# Patient Record
Sex: MALE | Race: BLACK OR AFRICAN AMERICAN | NOT HISPANIC OR LATINO | Employment: FULL TIME | URBAN - METROPOLITAN AREA
[De-identification: names, ages, dates, MRNs, and addresses within clinical notes are randomized per-mention and may not be internally consistent; named-entity substitution may affect disease eponyms.]

---

## 2019-08-01 ENCOUNTER — OFFICE VISIT (OUTPATIENT)
Dept: FAMILY MEDICINE CLINIC | Facility: CLINIC | Age: 39
End: 2019-08-01
Payer: COMMERCIAL

## 2019-08-01 VITALS
TEMPERATURE: 97.1 F | BODY MASS INDEX: 39.76 KG/M2 | HEART RATE: 76 BPM | SYSTOLIC BLOOD PRESSURE: 132 MMHG | WEIGHT: 284 LBS | HEIGHT: 71 IN | DIASTOLIC BLOOD PRESSURE: 78 MMHG | OXYGEN SATURATION: 95 % | RESPIRATION RATE: 20 BRPM

## 2019-08-01 DIAGNOSIS — R63.5 WEIGHT GAIN: ICD-10-CM

## 2019-08-01 DIAGNOSIS — E66.09 CLASS 2 OBESITY DUE TO EXCESS CALORIES WITHOUT SERIOUS COMORBIDITY WITH BODY MASS INDEX (BMI) OF 39.0 TO 39.9 IN ADULT: ICD-10-CM

## 2019-08-01 DIAGNOSIS — Z13.6 SCREENING FOR CARDIOVASCULAR CONDITION: ICD-10-CM

## 2019-08-01 DIAGNOSIS — Z00.00 WELL ADULT EXAM: Primary | ICD-10-CM

## 2019-08-01 DIAGNOSIS — L85.3 DRY SKIN DERMATITIS: ICD-10-CM

## 2019-08-01 PROCEDURE — 99385 PREV VISIT NEW AGE 18-39: CPT | Performed by: FAMILY MEDICINE

## 2019-08-01 NOTE — PROGRESS NOTES
Chief Complaint   Patient presents with   [address] Patient     Health Maintenance   Topic Date Due    BMI: Followup Plan  01/24/2000    INFLUENZA VACCINE  09/30/2019 (Originally 7/1/2019)    Depression Screening PHQ  08/01/2020    BMI: Adult  08/01/2020    DTaP,Tdap,and Td Vaccines (2 - Td) 08/01/2022    Pneumococcal Vaccine: 65+ Years (1 of 2 - PCV13) 01/24/2047    Pneumococcal Vaccine: Pediatrics (0 to 5 Years) and At-Risk Patients (6 to 59 Years)  Aged Out    HEPATITIS B VACCINES  Aged Out     BMI Counseling: Body mass index is 39 33 kg/m²  Discussed the patient's BMI with him  The BMI is above average  BMI counseling and education was provided to the patient  Nutrition recommendations include reducing portion sizes, decreasing overall calorie intake, 3-5 servings of fruits/vegetables daily, reducing fast food intake, consuming healthier snacks, decreasing soda and/or juice intake, moderation in carbohydrate intake, increasing intake of lean protein, reducing intake of saturated fat and trans fat and reducing intake of cholesterol  Exercise recommendations include moderate aerobic physical activity for 150 minutes/week  Assessment/Plan:    Well adult exam  Patient was recommended to follow a well-balanced diet, regular exercise  Advised to have regular dentist visits  Check labs  Dry skin dermatitis  Recommended to use moisturizing creams for dry skin  Class 2 obesity due to excess calories without serious comorbidity in adult  Discussed dietary and lifestyle modifications  Encouraged weight reduction  Weight gain  Check TSH level to r/o thyroid disease  Schedule physical exam in 1 year  Call office with any acute problems  Diagnoses and all orders for this visit:    Well adult exam  -     CBC and differential; Future  -     Comprehensive metabolic panel;  Future  -     CBC and differential  -     Comprehensive metabolic panel    Dry skin dermatitis    Class 2 obesity due to excess calories without serious comorbidity with body mass index (BMI) of 39 0 to 39 9 in adult    Weight gain  -     TSH, 3rd generation with Free T4 reflex; Future  -     TSH, 3rd generation with Free T4 reflex    Screening for cardiovascular condition  -     Lipid panel; Future  -     Lipid panel          Subjective:      Patient ID: Robe Maldonado is a 42 y o  male  HPI     New patient presents to \[facility]\"" medical care, well adult exam       Patient states that he has not been seen by PCP since age 14  No H/o asthma  Patient has lactose intolerance, seasonal allergies  He does not take any medications currently  C/o dry skin on his hands  Patient c/o weight gain for the past year  No recent blood work  Denies chest pain, shortness of breath, dizziness  Patient has a physical job, walks a lot  He had Tetanus booster in 2012  Denies tobacco, drug use  Patient drinks a few beers per week  The following portions of the patient's history were reviewed and updated as appropriate: allergies, current medications, past family history, past medical history, past social history, past surgical history and problem list     Review of Systems   Constitutional: Negative for activity change, appetite change, chills, fatigue and fever  HENT: Negative for congestion, dental problem, ear pain, hearing loss, mouth sores, nosebleeds, sore throat, tinnitus and trouble swallowing  Eyes: Negative for pain, discharge, redness, itching and visual disturbance  Respiratory: Negative for cough, chest tightness, shortness of breath and wheezing  Cardiovascular: Negative for chest pain, palpitations and leg swelling  Gastrointestinal: Negative for abdominal pain, blood in stool, constipation, diarrhea, nausea and vomiting  Genitourinary: Negative for difficulty urinating, dysuria, flank pain, frequency and hematuria     Musculoskeletal: Negative for arthralgias, back pain, gait problem, joint swelling, myalgias and neck pain  Skin: Negative for rash and wound  Dry scaly skin on hands  Neurological: Negative for dizziness, seizures, syncope and headaches  Hematological: Negative  Psychiatric/Behavioral: Negative  Objective:      /78 (BP Location: Left arm, Patient Position: Sitting, Cuff Size: Large)   Pulse 76   Temp (!) 97 1 °F (36 2 °C) (Tympanic)   Resp 20   Ht 5' 11 25" (1 81 m)   Wt 129 kg (284 lb)   SpO2 95%   BMI 39 33 kg/m²          Physical Exam   Constitutional: He is oriented to person, place, and time  He appears well-developed and well-nourished  Obese   HENT:   Head: Normocephalic and atraumatic  Right Ear: External ear normal    Left Ear: External ear normal    Mouth/Throat: Oropharynx is clear and moist    Eyes: Pupils are equal, round, and reactive to light  Conjunctivae are normal    Cardiovascular: Normal rate, regular rhythm and normal heart sounds  No murmur heard  No BL LE edema  No carotid bruits BL   Pulmonary/Chest: Effort normal and breath sounds normal    Abdominal: Soft  Bowel sounds are normal  There is no tenderness  Musculoskeletal: Normal range of motion  He exhibits no edema, tenderness or deformity  Neurological: He is alert and oriented to person, place, and time  No cranial nerve deficit  Coordination normal    Skin: Skin is warm and dry  No rash noted  Dry, scaly skin on hands   Psychiatric: He has a normal mood and affect  Nursing note and vitals reviewed

## 2019-08-01 NOTE — ASSESSMENT & PLAN NOTE
Patient was recommended to follow a well-balanced diet, regular exercise  Advised to have regular dentist visits  Check labs

## 2019-11-01 ENCOUNTER — TELEPHONE (OUTPATIENT)
Dept: FAMILY MEDICINE CLINIC | Facility: CLINIC | Age: 39
End: 2019-11-01

## 2019-11-01 DIAGNOSIS — Z20.818 EXPOSURE TO PERTUSSIS: Primary | ICD-10-CM

## 2019-11-01 RX ORDER — AZITHROMYCIN 250 MG/1
TABLET, FILM COATED ORAL
Qty: 6 TABLET | Refills: 0 | Status: SHIPPED | OUTPATIENT
Start: 2019-11-01 | End: 2019-11-05

## 2019-11-01 NOTE — TELEPHONE ENCOUNTER
Patient's wife called to say their daughter has the whooping cough and wanted to know if her  should take an antibiotic too? Please advise?

## 2019-11-01 NOTE — TELEPHONE ENCOUNTER
Please call patient  Rx sent to the pharmacy for Zithromax to take for 5 days for pertussis prophylaxis

## 2024-02-21 PROBLEM — Z00.00 WELL ADULT EXAM: Status: RESOLVED | Noted: 2019-08-01 | Resolved: 2024-02-21

## 2024-04-18 ENCOUNTER — OFFICE VISIT (OUTPATIENT)
Dept: FAMILY MEDICINE CLINIC | Facility: CLINIC | Age: 44
End: 2024-04-18

## 2024-04-18 VITALS
BODY MASS INDEX: 37.06 KG/M2 | TEMPERATURE: 97.6 F | RESPIRATION RATE: 18 BRPM | HEART RATE: 84 BPM | OXYGEN SATURATION: 98 % | SYSTOLIC BLOOD PRESSURE: 140 MMHG | HEIGHT: 72 IN | DIASTOLIC BLOOD PRESSURE: 90 MMHG | WEIGHT: 273.6 LBS

## 2024-04-18 DIAGNOSIS — R03.0 ELEVATED BLOOD PRESSURE READING: ICD-10-CM

## 2024-04-18 DIAGNOSIS — J30.2 SEASONAL ALLERGIES: ICD-10-CM

## 2024-04-18 DIAGNOSIS — J06.9 ACUTE UPPER RESPIRATORY INFECTION: Primary | ICD-10-CM

## 2024-04-18 DIAGNOSIS — E66.09 CLASS 2 OBESITY DUE TO EXCESS CALORIES WITHOUT SERIOUS COMORBIDITY WITH BODY MASS INDEX (BMI) OF 37.0 TO 37.9 IN ADULT: ICD-10-CM

## 2024-04-18 DIAGNOSIS — J02.9 ACUTE PHARYNGITIS, UNSPECIFIED ETIOLOGY: ICD-10-CM

## 2024-04-18 PROCEDURE — 99203 OFFICE O/P NEW LOW 30 MIN: CPT | Performed by: FAMILY MEDICINE

## 2024-04-18 RX ORDER — AZITHROMYCIN 250 MG/1
TABLET, FILM COATED ORAL
Qty: 6 TABLET | Refills: 0 | Status: SHIPPED | OUTPATIENT
Start: 2024-04-18 | End: 2024-04-22

## 2024-04-18 RX ORDER — LORATADINE 10 MG/1
10 TABLET ORAL DAILY
Qty: 30 TABLET | Refills: 5 | Status: SHIPPED | OUTPATIENT
Start: 2024-04-18

## 2024-04-18 NOTE — ASSESSMENT & PLAN NOTE
BP is elevated at 140/90.  Recommended to follow a low-sodium diet, start regular exercise, lose weight.    Schedule follow-up visit in 3 months to recheck BP.

## 2024-04-18 NOTE — ASSESSMENT & PLAN NOTE
Patient does not have medical insurance, would not perform throat culture or rapid strep test.  Patient's wife and children had strep throat.    Rx sent to pharmacy for Zithromax for 5 days.

## 2024-04-18 NOTE — PROGRESS NOTES
Name: Turner Del Cid      : 1980      MRN: [id number]  Encounter Provider: Smitha Guillen MD  Encounter Date: 2024   Encounter department: [facility]    Chief Complaint   Patient presents with    New Patient Visit     Re establishing care with PCP     Health Maintenance   Topic Date Due    Hepatitis C Screening  Never done    HIV Screening  Never done    Annual Physical  2020    DTaP,Tdap,and Td Vaccines (2 - Td or Tdap) 2022    Influenza Vaccine (1) Never done    COVID-19 Vaccine (1 -  season) Never done    Depression Screening  2025    Zoster Vaccine (1 of 2) 2032    Pneumococcal Vaccine: Pediatrics (0 to 5 Years) and At-Risk Patients (6 to 64 Years)  Aged Out    HIB Vaccine  Aged Out    IPV Vaccine  Aged Out    Hepatitis A Vaccine  Aged Out    Meningococcal ACWY Vaccine  Aged Out    HPV Vaccine  Aged Out       Assessment & Plan     1. Acute upper respiratory infection  Assessment & Plan:  Recommended to increase fluid intake.    Use throat lozenges PRN.      Orders:  -     azithromycin (ZITHROMAX) 250 mg tablet; Take 2 tablets 1 st day, then 1 tablet daily for 4 days, then stop    2. Acute pharyngitis, unspecified etiology  Assessment & Plan:  Patient does not have medical insurance, would not perform throat culture or rapid strep test.  Patient's wife and children had strep throat.    Rx sent to pharmacy for Zithromax for 5 days.        Orders:  -     azithromycin (ZITHROMAX) 250 mg tablet; Take 2 tablets 1 st day, then 1 tablet daily for 4 days, then stop    3. Elevated blood pressure reading  Assessment & Plan:  BP is elevated at 140/90.  Recommended to follow a low-sodium diet, start regular exercise, lose weight.    Schedule follow-up visit in 3 months to recheck BP.        4. Class 2 obesity due to excess calories without serious comorbidity with body mass index (BMI) of 37.0 to 37.9 in adult  Assessment & Plan:  Recommend to work on  "dietary and lifestyle modifications, losing weight.      5. Seasonal allergies  Assessment & Plan:  Take Claritin 10 mg daily during allergy seasons.      Orders:  -     loratadine (CLARITIN) 10 mg tablet; Take 1 tablet (10 mg total) by mouth daily           Schedule physical exam in 3 months.      Subjective      HPI    Patient presents to reestablish medical care.    Patient does not have medical insurance currently.      He came today c/o scratchy throat.  Denies fever, chills.  No headache. No cough.    He had upper respiratory infection 2 weeks ago.  Nasal congestion and cough improved.  His wife and children had strep throat.    Patient does not take any medications currently.      PMHx: Obesity, seasonal allergies.    Family history is positive for HTN.    Denies tobacco use.     Review of Systems   Constitutional:  Negative for activity change, appetite change, chills, fatigue and fever.   HENT:  Positive for congestion (mild) and sore throat. Negative for ear pain, postnasal drip, sinus pressure and trouble swallowing.    Eyes:  Negative for pain, discharge, redness, itching and visual disturbance.   Respiratory:  Negative for cough, chest tightness and shortness of breath.    Cardiovascular:  Negative for chest pain, palpitations and leg swelling.   Gastrointestinal:  Negative for abdominal pain, diarrhea, nausea and vomiting.   Musculoskeletal:  Negative for arthralgias, myalgias and neck pain.   Skin:  Negative for rash.   Neurological:  Negative for dizziness and headaches.   Hematological: Negative.    Psychiatric/Behavioral:  Negative for dysphoric mood and sleep disturbance. The patient is not nervous/anxious.        No current outpatient medications on file prior to visit.       Objective     /90   Pulse 84   Temp 97.6 °F (36.4 °C) (Tympanic)   Resp 18   Ht 5' 11.6\" (1.819 m)   Wt 124 kg (273 lb 9.6 oz)   SpO2 98%   BMI 37.52 kg/m²     Physical Exam  Vitals and nursing note reviewed. "   Constitutional:       Appearance: Normal appearance. He is obese.   HENT:      Head: Normocephalic and atraumatic.      Right Ear: Tympanic membrane normal.      Left Ear: Tympanic membrane normal.      Mouth/Throat:      Mouth: Mucous membranes are moist.      Pharynx: Posterior oropharyngeal erythema present. No oropharyngeal exudate.   Eyes:      Conjunctiva/sclera: Conjunctivae normal.      Pupils: Pupils are equal, round, and reactive to light.   Cardiovascular:      Rate and Rhythm: Normal rate and regular rhythm.      Heart sounds: No murmur heard.  Pulmonary:      Effort: Pulmonary effort is normal.      Breath sounds: Normal breath sounds.   Abdominal:      General: Bowel sounds are normal. There is no distension.      Palpations: Abdomen is soft.      Tenderness: There is no abdominal tenderness.   Musculoskeletal:         General: No swelling. Normal range of motion.      Cervical back: Normal range of motion and neck supple. No tenderness.      Right lower leg: No edema.      Left lower leg: No edema.   Lymphadenopathy:      Cervical: No cervical adenopathy.   Skin:     General: Skin is warm and dry.      Findings: No rash.   Neurological:      Mental Status: He is alert.   Psychiatric:         Mood and Affect: Mood normal.       Smitha Guillen MD

## 2024-05-10 DIAGNOSIS — J30.2 SEASONAL ALLERGIES: ICD-10-CM

## 2024-05-10 RX ORDER — LORATADINE 10 MG/1
10 TABLET ORAL DAILY
Qty: 90 TABLET | Refills: 1 | Status: SHIPPED | OUTPATIENT
Start: 2024-05-10

## 2024-05-18 PROBLEM — J02.9 ACUTE PHARYNGITIS: Status: RESOLVED | Noted: 2024-04-18 | Resolved: 2024-05-18

## 2024-05-18 PROBLEM — J06.9 ACUTE UPPER RESPIRATORY INFECTION: Status: RESOLVED | Noted: 2024-04-18 | Resolved: 2024-05-18
